# Patient Record
Sex: MALE | URBAN - METROPOLITAN AREA
[De-identification: names, ages, dates, MRNs, and addresses within clinical notes are randomized per-mention and may not be internally consistent; named-entity substitution may affect disease eponyms.]

---

## 2022-09-29 ENCOUNTER — PROCEDURE VISIT (OUTPATIENT)
Dept: SPORTS MEDICINE | Age: 22
End: 2022-09-29

## 2022-09-29 DIAGNOSIS — S59.802A HYPEREXTENSION INJURY OF LEFT ELBOW, INITIAL ENCOUNTER: Primary | ICD-10-CM

## 2022-09-29 NOTE — PROGRESS NOTES
Athletic Training  Date of Report: 2022  Name: Dmitry Enriquez: [facility]  Sport: Football  : 2000  Age: 23 y.o. MRN: <[id number]>  Encounter:  [x] New AT Eval     [] Follow-Up Visit    [] Other:   SUBJECTIVE:  Reason for Visit:    Chief Complaint   Patient presents with    Elbow Injury     Saloni Amaro is a 23y.o. year old, male who presents today for evaluation of athletic injury involving left elbow. 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 Saloni Amaro report they are right hand dominate. Onset of the injury began    and injury occurred during competition. Current pain and symptoms include: dull and shooting. Current level of pain is a 5. Symptoms have been intermittent since that time. Symptoms improve with  stretching, rest, ice, and medication: ibuprofen . Symptoms worsen with pushing with the involved arm and pulling with the involved arm. The patient can flex and extend elbow full. The hand has not felt numb and/or lost sensation. Associated sounds or feelings at time of injury included: none. Treatment to date has included: ice, medication: Ibuprofen, rest, and stretching. Treatment has been somewhat helpful. Previous history includes: Muscle Strain: 5 years ago . 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 He injured his elbow hyperextending it during the game over the weekend. He played the rest of the game, but notes he did not use his left arm much. He has not practiced this week, but plans to play on Saturday. OBJECTIVE:   Physical Exam  Vital Signs:   [x] There were no vitals taken for this visit  Date/Time Taken         Blood Pressure         Pulse          Constitution:   Appearance: Saloni Amaro is [x] alert, [x] appears stated age, and [x] in no distress.                          Saloni Amaro general body habitus is:    [] Cachectic [] Thin [x] Normal [] Obese [] Morbidly Obese  Pulmonary: Rate   [] Fast [x] Normal [] Slow    Rhythm  [x] Regular [] Irregular   Volume [x] Adequate  [] Shallow [] Deep  Effort  [] Labored [x] Unlabored  Skin:  Color  [x] Normal [] Pale [] Cyanotic    Temperature [] Hot   [x] Warm [] Cool  [] Cold     Moisture [] Dry  [x] Moist [] Warm    Psychiatric:   [x] Good judgement and insight. [x] Oriented to [x] person, [x] place, and [x] time. [x] Mood appropriate for circumstances. Elbow Positioning / Carry Position:    Elbow Position: [x] Normal  [] Guarding   [] Hanging Limp  Assistive Device: [x] None  [] Brace  [] Sling  [] Other:   Inspection:   Skin:   [x] Intact [] Abrasion  [] Laceration  Notes:   Ecchymosis:  [x] None [] Mild  [] Moderate  [] Severe  Notes: Moderate ecchymosis in multiple locations bilaterally on upper arms, but he is a defensive  - the ecchymosis does not appear to be related to this elbow injury.    Atrophy:  [x] None [] Mild  [] Moderate  [] Severe  Notes:   Effusion:  [x] None [] Mild  [] Moderate  [] Severe  Notes:   Deformity:  [x] None [] Mild  [] Moderate  [] Severe  Notes:   Scar / Surgical incision(s): [] A-Scope Portals  [] Open Surgical Incision(s)  Notes: None  Joint Hypertrophy:  Notes: None  Alignment:   [] Alignment was not assessed   Normal Measured Findings/Notes Passively Correctable to Normal   Cubitus Varus [x]  []   Cubitus Valgus [x]  []   Fixed Flexion [x]  []   Cubitus Recurvatum [x]  []    []  []    []  []   Orthopaedic Exam: Left Elbow  Palpation:   Tenderness: [] None  [x] Mild [] Moderate [] Severe   at: Triceps Tendon/Muscle Belly  Crepitation: [x] None  [] Mild [] Moderate [] Severe   at:  None  Effusion: [x] None  [] Mild [] Moderate [] Severe   at:  None  Brachial Pulse:  [] Not assessed [] Not Detected [x] Detected  Radial Pulse:  [] Not assessed [] Not Detected [x] Detected  Deformity:   Range of Motion: (Not assessed if not marked)  [x] Normal Flexibility / Mobility, no pain with A/PROM  ROM WNL PROM AROM OP Comments     L R L R L R Elbow Flexion  []          Elbow Extension []          Supination []          Pronation []          Wrist Flexion []          Wrist Extension []          Ulnar Deviation []          Radial Deviation []          Finger Opposition []           []           []          Manual Muscle Test: (Not assessed if not marked)  [] Normal Strength  MMT Left Right Comment   Elbow Extension 5  Mild Pain   Elbow Flexion 5     Supination 5     Pronation 5     Wrist Flexion 5     Wrist Extension 5  Mild Pain   Ulnar Deviation 5     Radial Deviation 5     Finger Abduction 5      Strength 5                 Provocative Tests: (Not tested if not marked)   Negative Positive Positive Findings    Collateral      Valgus Stress In 25° Flexion   [x] []    Varus Stress In 25° Flexion [x] []    Moving Valgus [x] []    Posterolateral Instability  [x] []    Chair Sign [] []    Push Up Sign [] []    Milking Maneuver [] []    Tendinopathy      Cozen's Test [] []    R. Tennis Elbow Test [x] []    P. Tennis Elbow Test [x] []    Golfer's Elbow Test [x] []    Hyperextension Test [] [x]    Neurologic      Ulnar Nerve Compression [x] []    Tinel Sign [x] []    Pinch  [x] []    Miscellaneous       [] []     [] []    Reflex / Motor Function:    Gross motor weakness of shoulder:  [x] None [] Mild  [] Moderate [] Severe  Notes:   Gross motor weakness of elbow:  [x] None [] Mild  [] Moderate [] Severe  Notes:   Gross motor weakness of wrist:  [x] None [] Mild  [] Moderate [] Severe  Notes:   Gross motor weakness of hand:  [x] None [] Mild  [] Moderate [] Severe  Notes:    Sensory / Neurologic Function:  [x] Sensation to light touch intact    [] Impaired:   [x] Deep tendon reflexes intact    [] Impaired:   [x] Coordination / proprioception intact  [] Impaired:   Contralateral Elbow:  [x] Normal ROM and function with no pain. ASSESSMENT:   Diagnosis Orders   1.  Hyperextension injury of left elbow, initial encounter          Status: As Tolerated  PLAN:  Treatment:  [] Rest  [] Ice   [] Wrap  [] Elevate  [] Tape  [] First Aid/Wound [] Moist Heat  [] Crutches  [] Brace  [] Splint  [] Sling  [] Immobilizer   [] Whirlpool  [] Massage  [] Pneumatic  [x] Rehab/Exercise  [] Other:   Guardian Contacted: No  Comments / Instructions: We discussed the risks of returning to activity before Yosvany's elbow is fully healed. Omer will not practice today or tomorrow, but may try to play Saturday. Follow up in [address] [place] [address] Monday to begin therapeutic exercises. Will refer to physician if pain persists. Follow-Up Care / Instructions:    Discharged: No  Electronically Signed By: Peggy Alba, ATR, LAT, ATC

## 2022-10-04 ENCOUNTER — PROCEDURE VISIT (OUTPATIENT)
Dept: SPORTS MEDICINE | Age: 22
End: 2022-10-04

## 2022-10-04 DIAGNOSIS — S59.802D HYPEREXTENSION INJURY OF LEFT ELBOW, SUBSEQUENT ENCOUNTER: Primary | ICD-10-CM

## 2022-10-04 NOTE — PROGRESS NOTES
Subjective:      Patient ID: Wang Pandya is a 23 y.o. male. Sofiya Krishnamurthy comes in today for therapeutic exercises for his L elbow. His elbow was very sore Sunday and Monday after playing on Saturday night. He will still experienced pain when putting force through his elbow (like pushing up from a table). But overall, he is feeling improvements from last week. Objective:   Physical Exam    Assessment:       Diagnosis Orders   1. Hyperextension injury of left elbow, subsequent encounter               Plan: Today Sofiya Krishnamurthy did the following exercises:  Palm Up Neural Flossing x20  Palm Down Neural Flossing x20  Supination/Pronation 3# 2x10 each  Wrist Flexion 5# 2x10  Wrist Extension 5# 2x10  Banded D2 Pattern 2x10  Ball Chest Pass to trampoline 2x20  Supine 10# Ball Chest Passes 2x15  Table Push-ups x5 - *elbow was fatigued by this time    Sofiya Krishnamurthy did well with all exercises today. By the end of rehab his elbow was fatigued and starting to get sharp pain but otherwise he had no difficulty with the exercises. Will continue rehab on Thursday.          Odette Proper, ATC

## 2022-10-06 ENCOUNTER — PROCEDURE VISIT (OUTPATIENT)
Dept: SPORTS MEDICINE | Age: 22
End: 2022-10-06

## 2022-10-06 DIAGNOSIS — S59.802D HYPEREXTENSION INJURY OF LEFT ELBOW, SUBSEQUENT ENCOUNTER: Primary | ICD-10-CM

## 2022-10-06 NOTE — PROGRESS NOTES
Subjective:      Patient ID: Elana Sauer is a 23 y.o. male. Kelsey Park comes in today for therapeutic exercises for his L elbow. His elbow was feeling a bit better yesterday, but about half way through practice last night he took another hit to the elbow. He did not finish practice. He reports his pain to be similar to what it was Monday. Objective:   Physical Exam    Assessment:       Diagnosis Orders   1. Hyperextension injury of left elbow, subsequent encounter               Plan: Today Kelsey Park did the following exercises:    Palm Up Neural Flossing x20  Palm Down Neural Flossing x20  Supination/Pronation 3# 2x10 each  Wrist Flexion 5# 2x10  Wrist Extension 5# 2x10  Banded D2 Pattern 2x10  Ball Chest Pass to trampoline 2x20  Supine 10# Ball Chest Passes 2x15    Pre Mod eStim x15 min      Kelsey Park did well with all exercises today. He felt they were slightly easier to complete today. Follow up in [address] [place] [address] Tuesday to continue therapeutic exercises.          Angie Ceja, [address] [place] [address]

## 2022-10-11 ENCOUNTER — PROCEDURE VISIT (OUTPATIENT)
Dept: SPORTS MEDICINE | Age: 22
End: 2022-10-11

## 2022-10-11 DIAGNOSIS — S59.802D HYPEREXTENSION INJURY OF LEFT ELBOW, SUBSEQUENT ENCOUNTER: Primary | ICD-10-CM

## 2022-10-11 NOTE — PROGRESS NOTES
Subjective:      Patient ID: Tramaine Bonilla is a 23 y.o. male. Clif Armijo comes in today for therapeutic exercises for his L elbow. His elbow pain continues to improve. He did have some pain at the end of football practice yesterday, but otherwise it felt good. He did not have a game this past weekend, but does have one this weekend. Objective:   Physical Exam    Assessment:       Diagnosis Orders   1. Hyperextension injury of left elbow, subsequent encounter               Plan: Today Clif Armijo did the following exercises:    Palm Up Neural Flossing x20  Palm Down Neural Flossing x20  Supination/Pronation 3# 2x10 each  Wrist Flexion 5# 2x10  Wrist Extension 5# 2x10  Banded D2 Pattern 2x10  Ball Chest Pass to trampoline 2x20  Supine 10# Ball Chest Passes 2x15      Clif Armijo did well with all exercises today. He was able to complete them all without pain, but his elbow felt fatigued by the end. Follow up in [address] [place] [address] Friday to continue therapeutic exercises.          Lila Arce, [address] [place] [address]

## 2023-08-17 ENCOUNTER — OFFICE VISIT (OUTPATIENT)
Dept: PEDIATRICS | Facility: CLINIC | Age: 23
End: 2023-08-17
Payer: COMMERCIAL

## 2023-08-17 VITALS
DIASTOLIC BLOOD PRESSURE: 75 MMHG | WEIGHT: 253 LBS | BODY MASS INDEX: 32.47 KG/M2 | SYSTOLIC BLOOD PRESSURE: 111 MMHG | HEIGHT: 74 IN | HEART RATE: 74 BPM

## 2023-08-17 DIAGNOSIS — L74.510 HYPERHIDROSIS OF AXILLA: ICD-10-CM

## 2023-08-17 DIAGNOSIS — Z00.129 ENCOUNTER FOR ROUTINE CHILD HEALTH EXAMINATION WITHOUT ABNORMAL FINDINGS: Primary | ICD-10-CM

## 2023-08-17 PROBLEM — L70.9 ACNE: Status: RESOLVED | Noted: 2023-08-17 | Resolved: 2023-08-17

## 2023-08-17 PROBLEM — M23.204 OLD TEAR OF MEDIAL MENISCUS OF LEFT KNEE: Status: RESOLVED | Noted: 2023-08-17 | Resolved: 2023-08-17

## 2023-08-17 PROBLEM — J06.9 UPPER RESPIRATORY INFECTION: Status: RESOLVED | Noted: 2023-08-17 | Resolved: 2023-08-17

## 2023-08-17 PROBLEM — S89.92XA KNEE INJURY, LEFT, INITIAL ENCOUNTER: Status: RESOLVED | Noted: 2023-08-17 | Resolved: 2023-08-17

## 2023-08-17 PROBLEM — J02.9 ACUTE PHARYNGITIS: Status: RESOLVED | Noted: 2023-08-17 | Resolved: 2023-08-17

## 2023-08-17 PROBLEM — M92.529 JUVENILE OSTEOCHONDROSIS OF TIBIAL TUBEROSITY: Status: RESOLVED | Noted: 2019-05-20 | Resolved: 2023-08-17

## 2023-08-17 PROBLEM — H66.90 ACUTE OTITIS MEDIA: Status: RESOLVED | Noted: 2023-08-17 | Resolved: 2023-08-17

## 2023-08-17 PROBLEM — M25.552 LEFT HIP PAIN: Status: RESOLVED | Noted: 2023-08-17 | Resolved: 2023-08-17

## 2023-08-17 PROBLEM — S93.409A ANKLE SPRAIN: Status: RESOLVED | Noted: 2023-08-17 | Resolved: 2023-08-17

## 2023-08-17 PROBLEM — B07.0 PLANTAR WARTS: Status: RESOLVED | Noted: 2023-08-17 | Resolved: 2023-08-17

## 2023-08-17 PROBLEM — M22.42 CHONDROMALACIA OF LEFT PATELLA: Status: RESOLVED | Noted: 2023-08-17 | Resolved: 2023-08-17

## 2023-08-17 PROBLEM — R29.898 WEAKNESS OF BOTH HIPS: Status: RESOLVED | Noted: 2019-05-20 | Resolved: 2023-08-17

## 2023-08-17 PROBLEM — M25.562 ACUTE PAIN OF LEFT KNEE: Status: RESOLVED | Noted: 2019-05-20 | Resolved: 2023-08-17

## 2023-08-17 PROBLEM — S83.207A ACUTE MENISCAL TEAR OF LEFT KNEE: Status: RESOLVED | Noted: 2023-08-17 | Resolved: 2023-08-17

## 2023-08-17 PROCEDURE — 90715 TDAP VACCINE 7 YRS/> IM: CPT | Performed by: PEDIATRICS

## 2023-08-17 PROCEDURE — 1036F TOBACCO NON-USER: CPT | Performed by: PEDIATRICS

## 2023-08-17 PROCEDURE — 3008F BODY MASS INDEX DOCD: CPT | Performed by: PEDIATRICS

## 2023-08-17 PROCEDURE — 90471 IMMUNIZATION ADMIN: CPT | Performed by: PEDIATRICS

## 2023-08-17 PROCEDURE — 99395 PREV VISIT EST AGE 18-39: CPT | Performed by: PEDIATRICS

## 2023-08-17 RX ORDER — ALUMINUM CHLORIDE
1 LIQUID (ML) TOPICAL NIGHTLY
Qty: 1 BOTTLE | Refills: 3 | Status: SHIPPED | OUTPATIENT
Start: 2023-08-17 | End: 2023-08-17 | Stop reason: SDUPTHER

## 2023-08-17 RX ORDER — ALUMINUM CHLORIDE
1 LIQUID (ML) TOPICAL NIGHTLY
Qty: 1 BOTTLE | Refills: 2 | Status: SHIPPED | OUTPATIENT
Start: 2023-08-17

## 2023-08-17 RX ORDER — ALBUTEROL SULFATE 90 UG/1
AEROSOL, METERED RESPIRATORY (INHALATION)
COMMUNITY
Start: 2019-12-17 | End: 2023-08-17 | Stop reason: ALTCHOICE

## 2023-08-17 RX ORDER — ALUMINUM CHLORIDE
1 LIQUID (ML) TOPICAL NIGHTLY
Qty: 1 BOTTLE | Refills: 3 | Status: SHIPPED | OUTPATIENT
Start: 2023-08-17 | End: 2023-08-17 | Stop reason: ALTCHOICE

## 2023-08-17 NOTE — PROGRESS NOTES
Subjective   History was provided by the  self .  Romero Solano is a 23 y.o. male who is here for this well visit.    Current Issues:  Current concerns include none, wants refill of rx antiperspirant .  Sleep: all night  Sleep hygiene    Review of Nutrition:  Current diet: healthy  Voiding and Stooling no constipation    Social Screening:   Parental relations: healthy  Concerns regarding behavior with peers?no  School performance: doing well; no concerns  [facility]  Extracurricular activities club football  Working internships  Career goals finance  Driving well    Screening Questions:  Risk factors for dyslipidemia: no  Risk factors for sexually-transmitted infections: no  Risk factors for alcohol/drug use:  no  Smoking? no    Physical Exam  Gen: Patient is alert and in NAD.   HEENT: Head is NC/AT. PERRL. EOMI. No conjunctival injection present. Fundi are NL; no esotropia or exotropia. TMs are transparent with good landmarks.  Nasopharynx is without significant edema or rhinorrhea. Oropharynx is clear with MMM. No tonsillar enlargement or exudates present. Good dentition.   Neck: supple; no lymphadenopathy or masses. CV: RRR, NL S1/S2, no murmurs.    Resp: CTA bilaterally; no wheezes or rhonchi; work of breathing is NL.    Abdomen: soft, non-tender, non-distended; no HSM or masses; positive bowel sounds.     Musculoskeletal: spine is straight; extremities are warm and dry with full ROM.    Neuro: NL gait, muscle tone, strength, and DTRs.    Skin: No significant rashes or lesions.    ASSESSMENT:  Well exam, 23 yrs    PLAN:  School performance, peer relationships, growth charts & BMI%, puberty, nutrition, and age appropriate exercise reviewed at today's Health Maintenance Visit  Advised to limit high sugar containing beverages (soda, juice, sports drinks)  Avoid excess portions  Focus on fresh unprocessed foods  Sport/work and college forms are able to be completed based on today's exam  Sun safety and driving  safety reviewed      Tdap given at today's visit  Benefits, risks, and side affects of ordered vaccines discussed. VIS statement provided  Influenza vaccine recommended every fall    Anticipatory Guidance Sheets for this age provided at this visit   Schedule next Health Maintenance Exam in  1 year  Discussed transition to an adult care provider in the next several years

## 2024-07-25 ENCOUNTER — LAB (OUTPATIENT)
Dept: LAB | Facility: LAB | Age: 24
End: 2024-07-25
Payer: COMMERCIAL

## 2024-07-25 DIAGNOSIS — J30.9 ALLERGIC RHINITIS, UNSPECIFIED: Primary | ICD-10-CM

## 2024-07-25 DIAGNOSIS — J45.909 UNSPECIFIED ASTHMA, UNCOMPLICATED (HHS-HCC): ICD-10-CM

## 2024-07-25 DIAGNOSIS — E55.9 VITAMIN D DEFICIENCY, UNSPECIFIED: ICD-10-CM

## 2024-07-25 LAB
25(OH)D3 SERPL-MCNC: 26 NG/ML (ref 30–100)
BASOPHILS # BLD AUTO: 0.04 X10*3/UL (ref 0–0.1)
BASOPHILS NFR BLD AUTO: 0.8 %
EOSINOPHIL # BLD AUTO: 0.01 X10*3/UL (ref 0–0.7)
EOSINOPHIL NFR BLD AUTO: 0.2 %
ERYTHROCYTE [DISTWIDTH] IN BLOOD BY AUTOMATED COUNT: 13.1 % (ref 11.5–14.5)
HCT VFR BLD AUTO: 44.6 % (ref 41–52)
HGB BLD-MCNC: 15.4 G/DL (ref 13.5–17.5)
IGE SERPL-ACNC: 182 IU/ML (ref 0–214)
IMM GRANULOCYTES # BLD AUTO: 0.01 X10*3/UL (ref 0–0.7)
IMM GRANULOCYTES NFR BLD AUTO: 0.2 % (ref 0–0.9)
LYMPHOCYTES # BLD AUTO: 1.66 X10*3/UL (ref 1.2–4.8)
LYMPHOCYTES NFR BLD AUTO: 31.7 %
MCH RBC QN AUTO: 31.4 PG (ref 26–34)
MCHC RBC AUTO-ENTMCNC: 34.5 G/DL (ref 32–36)
MCV RBC AUTO: 91 FL (ref 80–100)
MONOCYTES # BLD AUTO: 0.48 X10*3/UL (ref 0.1–1)
MONOCYTES NFR BLD AUTO: 9.2 %
NEUTROPHILS # BLD AUTO: 3.03 X10*3/UL (ref 1.2–7.7)
NEUTROPHILS NFR BLD AUTO: 57.9 %
NRBC BLD-RTO: 0 /100 WBCS (ref 0–0)
PLATELET # BLD AUTO: 241 X10*3/UL (ref 150–450)
RBC # BLD AUTO: 4.9 X10*6/UL (ref 4.5–5.9)
WBC # BLD AUTO: 5.2 X10*3/UL (ref 4.4–11.3)

## 2024-07-25 PROCEDURE — 82104 ALPHA-1-ANTITRYPSIN PHENO: CPT

## 2024-07-25 PROCEDURE — 82306 VITAMIN D 25 HYDROXY: CPT

## 2024-07-25 PROCEDURE — 82785 ASSAY OF IGE: CPT

## 2024-07-25 PROCEDURE — 36415 COLL VENOUS BLD VENIPUNCTURE: CPT

## 2024-07-25 PROCEDURE — 85025 COMPLETE CBC W/AUTO DIFF WBC: CPT

## 2024-07-29 LAB
A1AT PHENOTYP SERPL-IMP: NORMAL
A1AT SERPL-MCNC: 122 MG/DL (ref 90–200)

## 2025-02-02 NOTE — PROGRESS NOTES
Subjective   Patient ID:   [id number]   Romero Solano is a 25 y.o. male who presents for Allergic Rhinitis (NPV, pt has been seeing Durve in [place], pt stated he was on xolair and getting allergy shots. ).    Chief Complaint   Patient presents with    Allergic Rhinitis     NPV, pt has been seeing Durve in [place], pt stated he was on xolair and getting allergy shots.       Review of records from Dr. Moura:  Only 12 pages received.  Note dated Bita 10, 2024.    Complaints of allergy and asthma.  He has tried Mucinex in the past.  He felt his symptoms were worse with dust and pollen and was using his rescue inhaler or nebulizer daily despite taking Symbicort 80 mcg.  The note comments that he ran out of his inhaler.  Skin prick test was positive to grass, dust mites, cockroach and cat he was started on immunotherapy given Ryaltris, Zaditor, Singulair, Zyrtec and Trelegy 200 mcg.  Followup note from August 26, 2024, done poorly controlled asthma.  Followup November 11, 2024 states his asthma is under control.  CBC from July 2024 showed a normal eosinophil count and IgE level of 182.  He was started on Xolair in July 2024.  His PFT from October 2024 showed an FVC of 112% and FEV1 of 116% with no reversibility.    I suspect that I am missing multiple pages of records.  I do not see any chart notes that discussed starting Xolair or a chart note from his initial consult followup.    This is a new patient, with a H/O urticaria, axillary hyperhidrosis, presenting to establish care and continue allergy immunotherapy.    Patient reports his past experience with an allergist was not the best.  He has always had itchy eyes and sneezing since childhood.  In [facility] 2020, he states his dorm had black mold and he ended up with wheezing every morning and congestion.  He improved out of the dorm and was prescribed inhalers, which helped some.  He moved home May 2024 and reacted to his parents' cat.  He has only been using  Mucinex, Sudafed and Zyrtec.  Two days ago, he last took his medications.  He denies exposure to rodents or farm animals.    Patient ran out of Trelegy and stopped montelukast and Symbicort.  He uses his albuterol every evening which is when he feels the worst.  He takes it PRN working out or running.  He is controlled currently but preparing for increased Sx once spring starts.  He endorses heartburn Sx for which he takes TUMs daily.  Last night it was bad and he wonders if this is causing his throat-clearing.    A week ago, he was prescribed antibiotics for OM and completed them.  He was told he may have ruptured his right eardrum and notes his hearing is off currently.     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    Review of Systems   HENT:  Positive for congestion and hearing loss (right ear).         Chronic throat-clearing.   Gastrointestinal:         Heartburn.     Objective   /78   Pulse 80   Wt 121 kg (267 lb)   SpO2 98%   BMI 34.51 kg/m²      Physical Exam  Constitutional:       Appearance: Normal appearance.   HENT:      Head: Normocephalic and atraumatic.      Right Ear: External ear normal. There is no impacted cerumen.      Left Ear: External ear normal. There is no impacted cerumen.      Nose: Congestion present. No rhinorrhea.   Eyes:      Extraocular Movements: Extraocular movements intact.      Conjunctiva/sclera: Conjunctivae normal.      Pupils: Pupils are equal, round, and reactive to light.   Cardiovascular:      Rate and Rhythm: Normal rate and regular rhythm.      Heart sounds: No murmur heard.     No friction rub. No gallop.   Pulmonary:      Effort: No respiratory distress.      Breath sounds: No wheezing, rhonchi or rales.   Skin:     General: Skin is warm and dry.   Neurological:      Mental Status: He is alert.   Psychiatric:         Mood and Affect: Mood normal.         Behavior: Behavior normal.     Allergy testing was performed on Romero Solano  using standard technique. There were no immediate complications.    Test Results  Panel 1  1.   Histamine: 5 x  5  2.   Saline - Diluent: 0  3.   Cockroach: 0  4.   Cotton Linters: 0  5.   Cat: 0  6.   Do  7.   D. Farinae: 0  8.   D. Pter: 0  9.   Feather: 0  10. Alternaria: 0  Tree Panel  1.   Gino: 0  2.   Beech: 2  3.   Birch: 0  4.   [place]: 0  5.   Silver Maple: 0  6.   Hickory: 5  7.   Maple: 0  8.   Oak: 0  9.   [place]adena: 0  10. [place]ocks: 0  Grass/Misc Tree  1.   Bermuda: 0  2.   [place] Blue: 2  3.   Carrillo: 0  4.   Wilton: 2  5.   Orchard: 2  6.   Red Top: 2  7.   Rye Grass: 2  8.   Sweet Vernal: 0  9.   Black [place]: 0  10. [place]: 0  Weeds  1.   Cocklebur: 0  2.   Common Mugwort: 0  3.   English Plantain: 0  4.   Hemp: 0  5.   Goldenrod: 0  6.   Kochia: 0  7.   Lamb's Quarter: 0  8.   Munguia Elder: 0  9.   Pigweed: 0  10. Ragweed: 0  Molds  1.   Aspergillus Niger: 0  2.   Aureobasid: 0  3.   Bipolaris: 0  4.   Cladosporidium: 0  5.   Epicoccum: 0  6.   Fusarium: 0  7.   Geotrichum: 0  8.   Helminthosporium: 0  9.   Penicillium: 0  10. Phoma: 0    Intradermal allergy testing was performed on Romero Solano using standard technique. There were no immediate complications.    Test Results  ID  Cat: 3  Cockroach: 0  Common Weed Mix: 0  Cotton: 0  Do  Feather: 0  Mite Mix: 3  Mold Mix #1: 0  Mold Mix #2: 2  Ragweed: 1    Skin testing is positive to grass, trees, dust mite, mold, cat and ragweed.    Pulmonary Functions Testing Results:    FEV1   Date Value Ref Range Status   2025 108 liters Final     FVC   Date Value Ref Range Status   2025 93 liters Final     FEV1/FVC   Date Value Ref Range Status   2025 112 % Final      Assessment/Plan     Allergic rhinitis  He saw Dr. Moura 2024 for allergy and asthma Sx.  Testing was positive to grass, dust mite, cockroach and cat.  He started immunotherapy and given several medications.  He states his Sx are worse in the spring and he is  currently taking Mucinex, Sudafed and Zyrtec.      Skin testing is positive to grass, trees, dust mite, mold, cat and ragweed.    Handout on environmental controls provided.    Complain of allergy symptoms such as sneezing, rhinorrhea or nasal pruritus.  He is not currently taking any medications.  At this point would like to hold off on allergy immunotherapy and reassess him in the spring.  Unsure he is having some symptoms secondary to living with a cat again.  I recommend that he adds a air purifier to his bedroom.    Moderate persistent asthma with acute exacerbation ([facility])    He was on Xolair but had persistent upper airway congestion.  He was not sure if the Xolair was helpful.  He states when he had a follow-up in October he typically is symptom-free so we cannot contribute his improvement to the Xolair.    IO PFT shows a flattened inspiratory loop.  I think this is most likely secondary to reflux.    Restart Symbicort 2 inhalations 2 times a day.  Be sure to rinse your mouth and brush teeth after every use.      Change albuterol to Airsupra 2 inhalations every 4 hours as needed.  You may obtain a coupon card at airsupra.com    Gastroesophageal reflux disease  He C/O daily heartburn and takes TUMS PRN.    He is having a lot of symptoms.  I would like him to start pantoprazole.  Will discuss at follow-up if he should continue it.    By signing my name below, I, Evangelina Ganoa, attest that this documentation has been prepared under the direction and in the presence of Wendy Lima MD.  All medical record entries made by the Scribe were at my direction and personally dictated by me. I have reviewed the chart and agree that the record accurately reflects my personal performance of the history, physical exam, discussion and plan.

## 2025-02-03 ENCOUNTER — APPOINTMENT (OUTPATIENT)
Dept: ALLERGY | Facility: CLINIC | Age: 25
End: 2025-02-03
Payer: COMMERCIAL

## 2025-02-03 VITALS
BODY MASS INDEX: 34.51 KG/M2 | SYSTOLIC BLOOD PRESSURE: 129 MMHG | OXYGEN SATURATION: 98 % | WEIGHT: 267 LBS | DIASTOLIC BLOOD PRESSURE: 78 MMHG | HEART RATE: 80 BPM

## 2025-02-03 DIAGNOSIS — K21.9 GASTROESOPHAGEAL REFLUX DISEASE, UNSPECIFIED WHETHER ESOPHAGITIS PRESENT: ICD-10-CM

## 2025-02-03 DIAGNOSIS — J45.41 MODERATE PERSISTENT ASTHMA WITH ACUTE EXACERBATION (HHS-HCC): ICD-10-CM

## 2025-02-03 DIAGNOSIS — J30.9 ALLERGIC RHINITIS, UNSPECIFIED SEASONALITY, UNSPECIFIED TRIGGER: Primary | ICD-10-CM

## 2025-02-03 LAB
FEV1/FVC: 112 %
FEV1: 108 LITERS
FVC: 93 LITERS

## 2025-02-03 PROCEDURE — 99204 OFFICE O/P NEW MOD 45 MIN: CPT | Performed by: ALLERGY & IMMUNOLOGY

## 2025-02-03 PROCEDURE — 94375 RESPIRATORY FLOW VOLUME LOOP: CPT | Performed by: ALLERGY & IMMUNOLOGY

## 2025-02-03 PROCEDURE — 95004 PERQ TESTS W/ALRGNC XTRCS: CPT | Performed by: ALLERGY & IMMUNOLOGY

## 2025-02-03 PROCEDURE — 95024 IQ TESTS W/ALLERGENIC XTRCS: CPT | Performed by: ALLERGY & IMMUNOLOGY

## 2025-02-03 RX ORDER — MONTELUKAST SODIUM 10 MG/1
10 TABLET ORAL NIGHTLY
COMMUNITY
Start: 2024-11-11 | End: 2025-02-03 | Stop reason: WASHOUT

## 2025-02-03 RX ORDER — MINOXIDIL 50 MG/G
1 AEROSOL, FOAM TOPICAL 2 TIMES DAILY
COMMUNITY
Start: 2024-11-11

## 2025-02-03 RX ORDER — CETIRIZINE HYDROCHLORIDE 5 MG/1
1 TABLET ORAL
COMMUNITY
Start: 2024-11-11

## 2025-02-03 RX ORDER — BUDESONIDE 1 MG/2ML
1 INHALANT ORAL
COMMUNITY
Start: 2024-10-14 | End: 2025-02-03 | Stop reason: WASHOUT

## 2025-02-03 RX ORDER — ALBUTEROL SULFATE 90 UG/1
INHALANT RESPIRATORY (INHALATION)
COMMUNITY
Start: 2024-09-30

## 2025-02-03 RX ORDER — IPRATROPIUM BROMIDE AND ALBUTEROL SULFATE 2.5; .5 MG/3ML; MG/3ML
SOLUTION RESPIRATORY (INHALATION)
COMMUNITY
Start: 2024-10-14 | End: 2025-02-03 | Stop reason: WASHOUT

## 2025-02-03 RX ORDER — PANTOPRAZOLE SODIUM 40 MG/1
40 TABLET, DELAYED RELEASE ORAL
Qty: 30 TABLET | Refills: 11 | Status: SHIPPED | OUTPATIENT
Start: 2025-02-03 | End: 2026-02-03

## 2025-02-03 RX ORDER — FLUTICASONE FUROATE, UMECLIDINIUM BROMIDE AND VILANTEROL TRIFENATATE 100; 62.5; 25 UG/1; UG/1; UG/1
POWDER RESPIRATORY (INHALATION)
COMMUNITY
Start: 2024-11-11 | End: 2025-02-03 | Stop reason: WASHOUT

## 2025-02-03 RX ORDER — ALBUTEROL SULFATE AND BUDESONIDE 90; 80 UG/1; UG/1
2 AEROSOL, METERED RESPIRATORY (INHALATION) EVERY 4 HOURS PRN
Qty: 10.7 G | Refills: 0 | Status: SHIPPED | OUTPATIENT
Start: 2025-02-03 | End: 2026-02-03

## 2025-02-03 RX ORDER — BUDESONIDE AND FORMOTEROL FUMARATE DIHYDRATE 160; 4.5 UG/1; UG/1
2 AEROSOL RESPIRATORY (INHALATION)
Qty: 10.2 G | Refills: 11 | Status: SHIPPED | OUTPATIENT
Start: 2025-02-03 | End: 2026-02-03

## 2025-02-03 ASSESSMENT — ENCOUNTER SYMPTOMS: ROS GI COMMENTS: HEARTBURN.

## 2025-02-03 NOTE — PATIENT INSTRUCTIONS
Skin testing is positive to grass, trees, dust mite, mold, cat and ragweed.    Start pantoprazole one pill daily for heartburn for 2 months.    Restart Symbicort 2 inhalations 2 times a day.  Be sure to rinse your mouth and brush teeth after every use.      Change albuterol to Airsupra 2 inhalations every 4 hours as needed.  You may obtain a coupon card at airsupra.com    Be sure to wash your bedding, including your sheets, weekly in hot water, in order to reduce dust mites.    Encase your pillow and mattress in a hypoallergenic or anti-dust mite case.    Invest in a HEPA air filter if you do not have one, and if possible, put it in the patient's bedroom.    If you have pets, avoid having them in the bedroom.       Follow up in April 2025 or sooner if problems or symptoms worsen prior.

## 2025-02-03 NOTE — ASSESSMENT & PLAN NOTE
He C/O daily heartburn and takes TUMS PRN.    He is having a lot of symptoms.  I would like him to start pantoprazole.  Will discuss at follow-up if he should continue it.

## 2025-02-03 NOTE — ASSESSMENT & PLAN NOTE
He was on Xolair but had persistent upper airway congestion.  He was not sure if the Xolair was helpful.  He states when he had a follow-up in October he typically is symptom-free so we cannot contribute his improvement to the Xolair.    IO PFT shows a flattened inspiratory loop.  I think this is most likely secondary to reflux.    Restart Symbicort 2 inhalations 2 times a day.  Be sure to rinse your mouth and brush teeth after every use.      Change albuterol to Airsupra 2 inhalations every 4 hours as needed.  You may obtain a coupon card at airsupra.com

## 2025-02-03 NOTE — ASSESSMENT & PLAN NOTE
He saw Dr. Moura June 2024 for allergy and asthma Sx.  Testing was positive to grass, dust mite, cockroach and cat.  He started immunotherapy and given several medications.  He states his Sx are worse in the spring and he is currently taking Mucinex, Sudafed and Zyrtec.      Skin testing is positive to grass, trees, dust mite, mold, cat and ragweed.    Handout on environmental controls provided.    Complain of allergy symptoms such as sneezing, rhinorrhea or nasal pruritus.  He is not currently taking any medications.  At this point would like to hold off on allergy immunotherapy and reassess him in the spring.  Unsure he is having some symptoms secondary to living with a cat again.  I recommend that he adds a air purifier to his bedroom.

## 2025-04-09 ENCOUNTER — APPOINTMENT (OUTPATIENT)
Dept: ALLERGY | Facility: CLINIC | Age: 25
End: 2025-04-09
Payer: COMMERCIAL

## 2025-04-09 VITALS — HEIGHT: 74 IN | BODY MASS INDEX: 34.27 KG/M2 | WEIGHT: 267 LBS

## 2025-04-09 DIAGNOSIS — J30.9 ALLERGIC RHINITIS, UNSPECIFIED SEASONALITY, UNSPECIFIED TRIGGER: Primary | ICD-10-CM

## 2025-04-09 DIAGNOSIS — J45.50 SEVERE PERSISTENT ASTHMA WITHOUT COMPLICATION (MULTI): ICD-10-CM

## 2025-04-09 PROCEDURE — 96160 PT-FOCUSED HLTH RISK ASSMT: CPT | Performed by: ALLERGY & IMMUNOLOGY

## 2025-04-09 PROCEDURE — 99214 OFFICE O/P EST MOD 30 MIN: CPT | Performed by: ALLERGY & IMMUNOLOGY

## 2025-04-09 PROCEDURE — 3008F BODY MASS INDEX DOCD: CPT | Performed by: ALLERGY & IMMUNOLOGY

## 2025-04-09 RX ORDER — MONTELUKAST SODIUM 10 MG/1
10 TABLET ORAL DAILY
Qty: 90 TABLET | Refills: 1 | Status: SHIPPED | OUTPATIENT
Start: 2025-04-09 | End: 2025-10-06

## 2025-04-09 NOTE — PROGRESS NOTES
Subjective   Patient ID:   [id number]   Romero Solano is a 25 y.o. male who presents for Follow-up (Act 20).    Chief Complaint   Patient presents with    Follow-up     Act 20     Review of records from Dr. Moura:  Only 12 pages received.  Note dated Bita 10, 2024.  Complaints of allergy and asthma.  He has tried Mucinex in the past.  He felt his symptoms were worse with dust and pollen and was using his rescue inhaler or nebulizer daily despite taking Symbicort 80 mcg.  The note comments that he ran out of his inhaler.  Skin prick test was positive to grass, dust mites, cockroach and cat he was started on immunotherapy given Ryaltris, Zaditor, Singulair, Zyrtec and Trelegy 200 mcg.  Followup note from August 26, 2024, done poorly controlled asthma.  Followup November 11, 2024 states his asthma is under control.  CBC from July 2024 showed a normal eosinophil count and IgE level of 182.  He was started on Xolair in July 2024.  His PFT from October 2024 showed an FVC of 112% and FEV1 of 116% with no reversibility.  I suspect that I am missing multiple pages of records.  I do not see any chart notes that discussed starting Xolair or a chart note from his initial consult followup.      Patient presents for 2 month F/U of asthma, allergies and GERD.    Since last visit, 2-3-25, patient reports his asthma  is controlled for the most part. He flares about 1-2 times a week.  He has not tried cardio exercise recently and has only had time to lift weights.    Patient is starting to have nasal congestion, itching ears, postnasal drip, headaches.  He has had immunotherapy in the past.      Currently he is using his rescue inhaler, Trelegy and pantoprazole.  He will experience heartburn if he misses it.      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  Off work for 5 weeks to study for [place] [employer] license exam and a few other exams    Review of Systems   HENT:  Positive for congestion and postnasal  "drip.    Skin:         Itchy ears bilaterally.   Neurological:  Positive for headaches.     Objective   Ht 1.88 m (6' 2\")   Wt 121 kg (267 lb)   BMI 34.28 kg/m²      Physical Exam  Vitals reviewed.   Constitutional:       Appearance: Normal appearance.   HENT:      Head: Normocephalic and atraumatic.      Right Ear: External ear normal. There is no impacted cerumen. Tympanic membrane is retracted.      Left Ear: External ear normal. There is no impacted cerumen.      Nose: Mucosal edema and congestion present. No rhinorrhea.      Right Turbinates: Swollen.      Mouth/Throat:      Lips: Pink.      Mouth: Mucous membranes are moist.      Pharynx: Oropharynx is clear. Uvula midline.   Eyes:      Extraocular Movements: Extraocular movements intact.      Conjunctiva/sclera: Conjunctivae normal.      Pupils: Pupils are equal, round, and reactive to light.   Cardiovascular:      Rate and Rhythm: Normal rate and regular rhythm.      Heart sounds: No murmur heard.     No friction rub. No gallop.   Pulmonary:      Effort: Pulmonary effort is normal. No respiratory distress.      Breath sounds: Normal breath sounds and air entry. No decreased air movement. No decreased breath sounds, wheezing, rhonchi or rales.   Skin:     General: Skin is warm and dry.   Neurological:      Mental Status: He is alert.   Psychiatric:         Mood and Affect: Mood normal.         Behavior: Behavior normal.         Assessment/Plan     Severe persistent asthma without complication (Multi)  ACT is 20.  Sx are controlled but he has persistent flares 1-2x a week.  He is having allergy Sx exacerbations also.  He uses his rescue and Trelegy.    He will continue Trelegy one inhalation daily and Airsupra 2 inhalations every 4 hours as needed.     Restart montelukast at bedtime.    He will also be restarting his allergy immunotherapy and this will likely help with his asthma flares.      Allergic rhinitis  He C/O nasal congestion, itching ears, " postnasal drip, headaches.  He has had immunotherapy in the past.    He will continue Zyrtec and Singulair.    He will restart immunotherapy.    By signing my name below, I, Evangelina Gaona, attest that this documentation has been prepared under the direction and in the presence of Wendy Lima MD.  All medical record entries made by the Scribe were at my direction and personally dictated by me. I have reviewed the chart and agree that the record accurately reflects my personal performance of the history, physical exam, discussion and plan.

## 2025-04-09 NOTE — PATIENT INSTRUCTIONS
Trelegy one inhalation daily    Continue pantoprazole one pill daily     Continue Airsupra 2 inhalations every 4 hours as needed     Restart montelukast at bedtime    Restart allergy immunotherapy.  We will prepare vial mix and call you to schedule your first shot.    Follow up in 6 months or sooner if problems or symptoms worsen prior

## 2025-04-10 PROBLEM — J45.50 SEVERE PERSISTENT ASTHMA WITHOUT COMPLICATION (MULTI): Status: ACTIVE | Noted: 2025-04-10

## 2025-04-10 ASSESSMENT — ENCOUNTER SYMPTOMS: HEADACHES: 1

## 2025-04-10 NOTE — ASSESSMENT & PLAN NOTE
ACT is 20.  Sx are controlled but he has persistent flares 1-2x a week.  He is having allergy Sx exacerbations also.  He uses his rescue and Trelegy.    He will continue Trelegy one inhalation daily and Airsupra 2 inhalations every 4 hours as needed.     Restart montelukast at bedtime.    He will also be restarting his allergy immunotherapy and this will likely help with his asthma flares.

## 2025-04-10 NOTE — ASSESSMENT & PLAN NOTE
He C/O nasal congestion, itching ears, postnasal drip, headaches.  He has had immunotherapy in the past.    He will continue Zyrtec and Singulair.    He will restart immunotherapy.

## 2025-04-14 DIAGNOSIS — J30.89 PERENNIAL ALLERGIC RHINITIS: ICD-10-CM

## 2025-04-14 DIAGNOSIS — J30.1 HAY FEVER: ICD-10-CM

## 2025-04-14 DIAGNOSIS — J30.81 ANIMAL DANDER ALLERGY: Primary | ICD-10-CM

## 2025-04-14 PROCEDURE — 95165 ANTIGEN THERAPY SERVICES: CPT | Performed by: ALLERGY & IMMUNOLOGY

## 2025-04-15 ENCOUNTER — TELEPHONE (OUTPATIENT)
Dept: ALLERGY | Facility: CLINIC | Age: 25
End: 2025-04-15
Payer: COMMERCIAL

## 2025-04-30 ENCOUNTER — CLINICAL SUPPORT (OUTPATIENT)
Dept: ALLERGY | Facility: CLINIC | Age: 25
End: 2025-04-30
Payer: COMMERCIAL

## 2025-04-30 DIAGNOSIS — J30.2 SEASONAL ALLERGIES: ICD-10-CM

## 2025-04-30 PROCEDURE — 95117 IMMUNOTHERAPY INJECTIONS: CPT | Performed by: ALLERGY & IMMUNOLOGY

## 2025-05-07 ENCOUNTER — APPOINTMENT (OUTPATIENT)
Dept: ALLERGY | Facility: CLINIC | Age: 25
End: 2025-05-07
Payer: COMMERCIAL

## 2025-05-07 DIAGNOSIS — J30.2 SEASONAL ALLERGIES: ICD-10-CM

## 2025-05-07 PROCEDURE — 95117 IMMUNOTHERAPY INJECTIONS: CPT | Performed by: ALLERGY & IMMUNOLOGY

## 2025-05-14 ENCOUNTER — APPOINTMENT (OUTPATIENT)
Dept: ALLERGY | Facility: CLINIC | Age: 25
End: 2025-05-14
Payer: COMMERCIAL

## 2025-05-20 ENCOUNTER — CLINICAL SUPPORT (OUTPATIENT)
Dept: ALLERGY | Facility: CLINIC | Age: 25
End: 2025-05-20
Payer: COMMERCIAL

## 2025-05-20 DIAGNOSIS — J30.2 SEASONAL ALLERGIES: ICD-10-CM

## 2025-05-20 PROCEDURE — 95117 IMMUNOTHERAPY INJECTIONS: CPT | Performed by: ALLERGY & IMMUNOLOGY

## 2025-05-21 ENCOUNTER — APPOINTMENT (OUTPATIENT)
Dept: ALLERGY | Facility: CLINIC | Age: 25
End: 2025-05-21
Payer: COMMERCIAL

## 2025-06-03 ENCOUNTER — CLINICAL SUPPORT (OUTPATIENT)
Dept: ALLERGY | Facility: CLINIC | Age: 25
End: 2025-06-03
Payer: COMMERCIAL

## 2025-06-03 DIAGNOSIS — J30.2 SEASONAL ALLERGIES: ICD-10-CM

## 2025-06-03 PROCEDURE — 95117 IMMUNOTHERAPY INJECTIONS: CPT | Performed by: ALLERGY & IMMUNOLOGY

## 2025-06-04 ENCOUNTER — APPOINTMENT (OUTPATIENT)
Dept: ALLERGY | Facility: CLINIC | Age: 25
End: 2025-06-04
Payer: COMMERCIAL

## 2025-06-11 ENCOUNTER — CLINICAL SUPPORT (OUTPATIENT)
Dept: ALLERGY | Facility: CLINIC | Age: 25
End: 2025-06-11
Payer: COMMERCIAL

## 2025-06-11 DIAGNOSIS — J30.9 ALLERGIC RHINITIS, UNSPECIFIED SEASONALITY, UNSPECIFIED TRIGGER: ICD-10-CM

## 2025-06-11 PROCEDURE — 95117 IMMUNOTHERAPY INJECTIONS: CPT | Performed by: ALLERGY & IMMUNOLOGY

## 2025-06-18 ENCOUNTER — APPOINTMENT (OUTPATIENT)
Dept: ALLERGY | Facility: CLINIC | Age: 25
End: 2025-06-18
Payer: COMMERCIAL

## 2025-06-18 DIAGNOSIS — J30.2 SEASONAL ALLERGIC RHINITIS, UNSPECIFIED TRIGGER: ICD-10-CM

## 2025-06-18 PROCEDURE — 95117 IMMUNOTHERAPY INJECTIONS: CPT | Performed by: ALLERGY & IMMUNOLOGY

## 2025-06-25 ENCOUNTER — APPOINTMENT (OUTPATIENT)
Dept: ALLERGY | Facility: CLINIC | Age: 25
End: 2025-06-25
Payer: COMMERCIAL

## 2025-06-25 DIAGNOSIS — J30.2 SEASONAL ALLERGIC RHINITIS, UNSPECIFIED TRIGGER: ICD-10-CM

## 2025-06-25 PROCEDURE — 95117 IMMUNOTHERAPY INJECTIONS: CPT | Performed by: ALLERGY & IMMUNOLOGY

## 2025-07-02 ENCOUNTER — APPOINTMENT (OUTPATIENT)
Dept: ALLERGY | Facility: CLINIC | Age: 25
End: 2025-07-02
Payer: COMMERCIAL

## 2025-07-02 DIAGNOSIS — J30.2 SEASONAL ALLERGIES: ICD-10-CM

## 2025-07-02 PROCEDURE — 95117 IMMUNOTHERAPY INJECTIONS: CPT | Performed by: ALLERGY & IMMUNOLOGY

## 2025-07-09 ENCOUNTER — APPOINTMENT (OUTPATIENT)
Dept: ALLERGY | Facility: CLINIC | Age: 25
End: 2025-07-09
Payer: COMMERCIAL

## 2025-07-15 ENCOUNTER — APPOINTMENT (OUTPATIENT)
Dept: ALLERGY | Facility: CLINIC | Age: 25
End: 2025-07-15
Payer: COMMERCIAL

## 2025-07-15 DIAGNOSIS — J30.2 SEASONAL ALLERGIES: ICD-10-CM

## 2025-07-15 PROCEDURE — 95117 IMMUNOTHERAPY INJECTIONS: CPT | Performed by: ALLERGY & IMMUNOLOGY

## 2025-07-22 ENCOUNTER — APPOINTMENT (OUTPATIENT)
Dept: ALLERGY | Facility: CLINIC | Age: 25
End: 2025-07-22
Payer: COMMERCIAL

## 2025-07-22 DIAGNOSIS — J30.2 SEASONAL ALLERGIC RHINITIS, UNSPECIFIED TRIGGER: ICD-10-CM

## 2025-07-22 PROCEDURE — 95117 IMMUNOTHERAPY INJECTIONS: CPT | Performed by: ALLERGY & IMMUNOLOGY

## 2025-07-29 ENCOUNTER — APPOINTMENT (OUTPATIENT)
Dept: ALLERGY | Facility: CLINIC | Age: 25
End: 2025-07-29
Payer: COMMERCIAL

## 2025-07-29 DIAGNOSIS — J30.2 SEASONAL ALLERGIES: ICD-10-CM

## 2025-07-29 PROCEDURE — 95117 IMMUNOTHERAPY INJECTIONS: CPT | Performed by: ALLERGY & IMMUNOLOGY

## 2025-08-05 ENCOUNTER — APPOINTMENT (OUTPATIENT)
Dept: ALLERGY | Facility: CLINIC | Age: 25
End: 2025-08-05
Payer: COMMERCIAL

## 2025-08-05 DIAGNOSIS — J30.2 SEASONAL ALLERGIES: ICD-10-CM

## 2025-08-05 PROCEDURE — 95117 IMMUNOTHERAPY INJECTIONS: CPT | Performed by: ALLERGY & IMMUNOLOGY

## 2025-08-19 ENCOUNTER — APPOINTMENT (OUTPATIENT)
Dept: ALLERGY | Facility: CLINIC | Age: 25
End: 2025-08-19
Payer: COMMERCIAL

## 2025-08-19 DIAGNOSIS — J30.2 SEASONAL ALLERGIES: ICD-10-CM

## 2025-08-19 PROCEDURE — 95117 IMMUNOTHERAPY INJECTIONS: CPT | Performed by: ALLERGY & IMMUNOLOGY

## 2025-08-26 ENCOUNTER — APPOINTMENT (OUTPATIENT)
Dept: ALLERGY | Facility: CLINIC | Age: 25
End: 2025-08-26
Payer: COMMERCIAL

## 2025-08-26 DIAGNOSIS — J30.2 SEASONAL ALLERGIES: ICD-10-CM

## 2025-08-26 PROCEDURE — 95117 IMMUNOTHERAPY INJECTIONS: CPT | Performed by: ALLERGY & IMMUNOLOGY

## 2025-09-02 ENCOUNTER — APPOINTMENT (OUTPATIENT)
Dept: ALLERGY | Facility: CLINIC | Age: 25
End: 2025-09-02
Payer: COMMERCIAL

## 2025-09-09 ENCOUNTER — APPOINTMENT (OUTPATIENT)
Dept: ALLERGY | Facility: CLINIC | Age: 25
End: 2025-09-09
Payer: COMMERCIAL

## 2025-09-16 ENCOUNTER — APPOINTMENT (OUTPATIENT)
Dept: ALLERGY | Facility: CLINIC | Age: 25
End: 2025-09-16
Payer: COMMERCIAL

## 2025-09-30 ENCOUNTER — APPOINTMENT (OUTPATIENT)
Dept: ALLERGY | Facility: CLINIC | Age: 25
End: 2025-09-30
Payer: COMMERCIAL